# Patient Record
Sex: MALE | Race: WHITE
[De-identification: names, ages, dates, MRNs, and addresses within clinical notes are randomized per-mention and may not be internally consistent; named-entity substitution may affect disease eponyms.]

---

## 2018-03-15 ENCOUNTER — HOSPITAL ENCOUNTER (OUTPATIENT)
Dept: HOSPITAL 89 - MRI | Age: 68
End: 2018-03-15
Attending: PSYCHIATRY & NEUROLOGY
Payer: MEDICARE

## 2018-03-15 DIAGNOSIS — I67.82: ICD-10-CM

## 2018-03-15 DIAGNOSIS — R90.82: Primary | ICD-10-CM

## 2018-03-15 PROCEDURE — 70551 MRI BRAIN STEM W/O DYE: CPT

## 2018-03-15 NOTE — RADIOLOGY IMAGING REPORT
[place] [facility] [place] 

 

PATIENT NAME: Luke Bledsoe

: 1950

MR: [id number]

V: [id number]

EXAM DATE: 

ORDERING PHYSICIAN: ANAIS VILLELA

TECHNOLOGIST: 

 

Location: [facility]

Patient: Luke Bledsoe

: 1950

MRN: [id number]

Visit/Account:[id number]

Date of Sevice:  3/15/2018

 

ACCESSION #: [id number]

 

MRI Brain without IV contrast

 

Indication: Cognitive decline.  Forgetfulness.

 

Comparison:  None available

 

Technique: Sagittal T1-weighted, axial FLAIR, T2-weighted T1-weighted, gradient echo, coronal T2-weig
hted, axial diffusion weighted and ADC map images were obtained through the brain.  IV contrast was n
ot administered.

 

Findings:

No evidence of mass, mass effect, or midline shift.

No acute intracranial hemorrhage or acute territorial infarction.

Unremarkable appearance of the corpus callosum.  Normal posterior pituitary bright spot noted.

 

No restricted diffusion on the diffusion-weighted images.

Prominent multifocal periventricular deep white matter hyperintensities within the corona radiata and
 centrum semiovale seen related to chronic small vessel ischemic changes versus underlying vasculitis
 or chronic migraines.  A demyelinating process is considered less likely with this distribution.

 

Normal flow voids cerebral vasculature.

There are no air-fluid levels identified.  Nonspecific mild patchy opacification visualized right mas
toid air cells.  There are also mucous retention cysts of the visualized bilateral maxillary sinuses 
slightly greater on the right than the left as well as near circumferential mucosal thickening visual
ized bilateral maxillary sinuses.  The ethmoid air cells appear patent.

 

Bilateral globes are intact.

 

 

IMPRESSION:

1.  Prominent periventricular deep white matter chronic small vessel ischemic changes suggested versu
s chronic migraines or underlying vasculitis.

2.  No acute intracranial abnormality.

 

Report Dictated By: Felipe Marie MD at 3/15/2018 2:27 PM

 

Report E-Signed By: Felipe Marie MD  at 3/15/2018 2:31 PM

 

WSN:[id number]

## 2018-08-28 ENCOUNTER — HOSPITAL ENCOUNTER (OUTPATIENT)
Dept: HOSPITAL 89 - US | Age: 68
End: 2018-08-28
Attending: NURSE PRACTITIONER
Payer: MEDICARE

## 2018-08-28 DIAGNOSIS — R59.0: Primary | ICD-10-CM

## 2018-08-28 DIAGNOSIS — R22.1: ICD-10-CM

## 2018-08-28 PROCEDURE — 76536 US EXAM OF HEAD AND NECK: CPT

## 2018-08-28 NOTE — RADIOLOGY IMAGING REPORT
FACILITY: [facility] 

 

PATIENT NAME: Luke Bledsoe

: 1950

MR: [id number]

V: [id number]

EXAM DATE: 

ORDERING PHYSICIAN: EVELYN LEON

TECHNOLOGIST: 

 

Location: [facility]

Patient: Luke Bledsoe

: 1950

MRN: [id number]

Visit/Account:[id number]

Date of Sevice:  2018

 

ACCESSION #: [id number]

 

SOFT TISSUE HEAD NECK

 

HISTORY:  Two palpable lumps on left side of neck

 

COMPARISON:  None.

 

FINDINGS:

Thyroid :

      Right - Negative.

      Left - Negative.

 

Largest cervical nodes:

      Right - none identified

      Left - the palpable area corresponds to submandibular area, anterior zone II.  There is a well-
circumscribed benign-appearing lymph node measuring 6 x 8 x 3 mm.  There is also a small cyst in this
 area that measures 5 x 4 x 3 mm.

 

Small subcentimeter benign lymph nodes are noted within the remainder of the cervical chains.

 

IMPRESSION:

In the region of patient's palpable mass, there is a small subcentimeter benign lymph node and a smal
l subcentimeter cyst.

 

Report Dictated By: Luis Hammer at 2018 3:51 PM

 

Report E-Signed By: Luis Hammer  at 2018 4:01 PM

 

WSN:[email or web address]

## 2018-12-06 ENCOUNTER — HOSPITAL ENCOUNTER (OUTPATIENT)
Dept: HOSPITAL 89 - OR | Age: 68
Discharge: HOME | End: 2018-12-06
Attending: SURGERY
Payer: MEDICARE

## 2018-12-06 VITALS — DIASTOLIC BLOOD PRESSURE: 76 MMHG | SYSTOLIC BLOOD PRESSURE: 117 MMHG

## 2018-12-06 VITALS — WEIGHT: 160 LBS | HEIGHT: 68 IN | BODY MASS INDEX: 24.25 KG/M2

## 2018-12-06 VITALS — DIASTOLIC BLOOD PRESSURE: 69 MMHG | SYSTOLIC BLOOD PRESSURE: 119 MMHG

## 2018-12-06 VITALS — DIASTOLIC BLOOD PRESSURE: 91 MMHG | SYSTOLIC BLOOD PRESSURE: 138 MMHG

## 2018-12-06 VITALS — DIASTOLIC BLOOD PRESSURE: 66 MMHG | SYSTOLIC BLOOD PRESSURE: 113 MMHG

## 2018-12-06 VITALS — DIASTOLIC BLOOD PRESSURE: 66 MMHG | SYSTOLIC BLOOD PRESSURE: 107 MMHG

## 2018-12-06 VITALS — SYSTOLIC BLOOD PRESSURE: 108 MMHG | DIASTOLIC BLOOD PRESSURE: 66 MMHG

## 2018-12-06 VITALS — SYSTOLIC BLOOD PRESSURE: 115 MMHG | DIASTOLIC BLOOD PRESSURE: 63 MMHG

## 2018-12-06 VITALS — DIASTOLIC BLOOD PRESSURE: 67 MMHG | SYSTOLIC BLOOD PRESSURE: 104 MMHG

## 2018-12-06 VITALS — DIASTOLIC BLOOD PRESSURE: 69 MMHG | SYSTOLIC BLOOD PRESSURE: 106 MMHG

## 2018-12-06 VITALS — DIASTOLIC BLOOD PRESSURE: 68 MMHG | SYSTOLIC BLOOD PRESSURE: 96 MMHG

## 2018-12-06 VITALS — DIASTOLIC BLOOD PRESSURE: 70 MMHG | SYSTOLIC BLOOD PRESSURE: 111 MMHG

## 2018-12-06 VITALS — DIASTOLIC BLOOD PRESSURE: 70 MMHG | SYSTOLIC BLOOD PRESSURE: 119 MMHG

## 2018-12-06 VITALS — DIASTOLIC BLOOD PRESSURE: 70 MMHG | SYSTOLIC BLOOD PRESSURE: 113 MMHG

## 2018-12-06 DIAGNOSIS — K40.20: Primary | ICD-10-CM

## 2018-12-06 PROCEDURE — 49650 LAP ING HERNIA REPAIR INIT: CPT

## 2018-12-06 NOTE — SHORT(OUTPT) DISCHARGE SUMMARY
Discharge Summary


Reason for Hosp/Final Diag:  


(1) Bilateral inguinal hernia (BIH)


Status:  Resolved


Hospital Course & Plan:  Robotic BIH repair completed without problems.





Departure


Discharge to:  Home, Self Care





Discharge Instructions


Home Meds


Active Scripts


Docusate Sodium (COLACE) 100 Mg Capsule, 1 CAP PO BID, #30 CAPSULE 0 Refills


   Prov:ULLRICH,JOHN A MD         12/6/18


Oxycodone Hcl/Acet 5/325 Mg (ENDOCET 5-325 TABLET) 1 Each Tablet, 1 TAB PO Q4H 


PRN for PAIN, #20 TAB 0 Refills


   Prov:ULLRICH,JOHN A MD         12/6/18


Reported Medications


Multivitamin (MULTI VITAMIN DAILY) 1 Each Tablet, 1 TAB PO QDAY


   10/23/18


Aspirin (ASPIRIN) 81 Mg Tab.chew, 81 MG PO QDAY, TAB.CHEW


   10/23/18


Omega-3 Fatty Acids/Fish Oil (FISH OIL 1,000 MG SOFTGEL) Unknown Strength 


Capsule, PO, CAPSULE


   10/23/18


Ascorbic Acid (VITAMIN C) Unknown Strength Tablet, PO, TAB


   10/23/18


Cholecalciferol (Vitamin D3) (VITAMIN D) Unknown Strength Capsule, PO, CAPSULE


   10/23/18


Turmeric (TURMERIC) Unknown Strength Powder, MC


   10/23/18


Levothyroxine Sodium (LEVOTHYROXINE SODIUM) 75 Mcg Tablet, 1 TAB PO QDAY, TAB


   10/12/18


Amitriptyline Hcl (AMITRIPTYLINE HCL) 50 Mg Tablet, 1 TAB PO QHS, #5 TAB


   10/12/18


Follow up Referrals:  


General Surgery - 12/18/18 @ Surgery, General with ULLRICH,JOHN A MD You have a 


follow up appointment scheduled with Dr. Ullrich on Tuesday, 12/18/18, at 


4:15pm.





Diet:  Regular


Activity:  No Heavy Lifting


Special Instructions:  


You may remove the white surgical dressings on Saturday, 12/8/18, then you


can shower.  After showering, leave the incisions open to air but leave


the steristrips in place until they fall off on their own.  Do not immerse


the incisions for 2 weeks.  Avoid any activity that involves straining or


lifting more than 10 pounds for 2 weeks after surgery.





Problem Qualifiers





(1) Bilateral inguinal hernia (BIH):  


Obstruction and gangrene presence:  without obstruction or gangrene  Recurrence:


 non-recurrent  Qualified Codes:  K40.20 - Bilateral inguinal hernia, without 


obstruction or gangrene, not specified as recurrent








ULLRICH,JOHN A MD               Dec 6, 2018 12:51

## 2018-12-06 NOTE — POST OPERATIVE PROGRESS NOTE
Post Operative Progress Note


Date:  Dec 6, 2018


Time:  12:51


Surgeon:  


Ullrich





Dictation number:  [id number]


Anesthesia:  


GETA by Dr. Moody


Pre-Op Diagnosis:  


RI


Post-Op Diagnosis:  


BIH


Findings:  


Bilateral indirect inguinal hernias


Procedure(s):  


Robotic BIH repair


Adhesiolysis


Specimen Removed:(May be N/A):  


None


Complications:  


None


Fluids:  


See anesthesia record


Estimated Blood Loss:  


Minimal


Date OP Note Dictated:  Dec 6, 2018


Time OP Note Dictated:  12:52











ULLRICH,JOHN A MD               Dec 6, 2018 12:58

## 2018-12-06 NOTE — OPERATIVE REPORT 1
EVENT DATE: December 6, 2018

SURGEON: John Ullrich, MD 

ANESTHESIOLOGIST: Ismael Moody MD 

ANESTHESIA: General endotracheal 



PREOPERATIVE DIAGNOSIS  

Right inguinal hernia. 



POSTOPERATIVE DIAGNOSIS 

Bilateral inguinal hernias, indirect on both sides.  



PROCEDURE PERFORMED 

1.  Robotic bilateral inguinal hernia repair. 

2.  Robotic adhesiolysis.



COMPLICATIONS 

None. 



CONDITION 

Stable. 



ESTIMATED BLOOD LOSS 

Minimal.  



FINDINGS 

This patient had bilateral indirect inguinal hernias.  



INDICATIONS

This is a 68-year-old gentleman who presented to my office with a right groin 

bulge.  On exam, this was consistent with an inguinal hernia.  The other groin 

was unremarkable.  I discussed hernias with him and their treatment including 

robotic hernia repair and he elected to proceed with robotic inguinal hernia 

repair.  



DESCRIPTION OF PROCEDURE 

The patient was brought to the operating room and placed supine on the operating

table.  General endotracheal anesthesia was administered.  His abdomen was 

prepped and draped in sterile fashion.  A timeout was completed and I injected 

the left subcostal skin with 0.5% Ropivacaine plain and made an 8 mm transverse 

incision in the left subcostal skin several centimeters inferior to the costal 

margin and just lateral to the mid clavicular line.  I used a Veress needle and 

insufflated the abdomen to a pressure of 15 mmHg and then inserted a robotic 8 

mm optical port in through this wound with the camera in focus without any 

problems.  Next, I placed an 8 mm robotic port in the right subcostal skin and 

then one in the epigastric midline.  Inspection of the groins revealed the 

expected right inguinal hernia which was an indirect hernia as well as some 

adhesions of omentum to the abdominal wall where the previous appendectomy had 

been performed many years ago.  Also apparent, was another indirect inguinal 

hernia on the left groin.  I popped in a bilateral ProGrip mesh as well as 2 V-

Loc sutures into the abdomen and then brought the robot in, docked it, and 

targeted it, inserted the instruments and the scrubbed out and went to the 

console.  I then took down the omentum from the anterior abdominal wall which 

was done rather easily, no bowel was involved.  I then divided the peritoneum 

from just medial to the right anterior superior iliac spine all the way across 

the midline, all the way to the left anterior superior iliac spine.  I then 

dissected through the preperitoneal space and there were some thin areas just to

the patient's right of midline, probably related to his previous robotic 

prostatectomy, and so I actually went into the posterior rectus sheath and 

stripped this down as well to keep the integrity of the tissue good to cover the

mesh.  I dissected all the way down along the iliopubic tracts medially to the 

pubic bone and Nain's ligament.  This dissection in the midline was somewhat 

difficult made so by the adhesions from the previous prostatectomy, but I was 

able to do this without any injury to the bladder and I was able to identify all

of the landmarks.  I started with the left hernia and reduced it from the 

inguinal canal and  it from the cord structures relatively intact in 

terms of the sac, but down past where the vessels and vas splay there was a 

peritoneal defect where I could see in the peritoneal cavity through this defect

on that side.  There was a cord lipoma that I reduced out of the inguinal canal 

on the left as well and  this from the cord structures.  The vas and 

gonadal vessels were easily identified and preserved.  I then moved to the right

side and did the same procedure and  the much bigger hernia sac and it 

went much further down into the inguinal canal, but I was able to find the end 

of it and then separate it from the cord structures and dissected it free well 

past where all of the cord structures splayed.  After this was completed, I put 

the ProGrip mesh on each side and unfurled it, starting with the right side and 

made sure it covered the pubic tubercle as well as Nain's ligament and it 

covered the whole myopectineal orifice with several centimeters with overlap on 

all sides.  Then I deployed the left sided mesh in exactly the same way so they 

laid nice and symmetrically and flat with no wrinkles. I then closed the 

peritoneal defect with a running V-Loc absorbable suture and then took another 

V-Loc suture and closed the defect in the patient's left groin, peritoneum so 

that there was no exposed mesh.  The repair looked great when this was all done 

and so all of the sutures were removed from the abdomen as well as the 

instruments and then the robot was undocked.  The patient's abdomen desufflated 

and the ports were removed and I closed the skin with 4-0 Monocryl subcuticular 

sutures.  The skin was cleaned, dried, and Steri-Strips were applied, followed 

by sterile surgical dressings on each incision.  The patient was then awakened 

and extubated in the operating room and transported to the recovery room in 

stable medication condition having tolerated the procedure without any apparent 

problems.  

MTDD

## 2021-07-01 ENCOUNTER — APPOINTMENT (RX ONLY)
Dept: URBAN - NONMETROPOLITAN AREA CLINIC 29 | Facility: CLINIC | Age: 71
Setting detail: DERMATOLOGY
End: 2021-07-01

## 2021-07-01 DIAGNOSIS — L57.0 ACTINIC KERATOSIS: ICD-10-CM

## 2021-07-01 DIAGNOSIS — L21.8 OTHER SEBORRHEIC DERMATITIS: ICD-10-CM

## 2021-07-01 PROCEDURE — ? PRESCRIPTION

## 2021-07-01 PROCEDURE — 99203 OFFICE O/P NEW LOW 30 MIN: CPT | Mod: 25

## 2021-07-01 PROCEDURE — ? PRESCRIPTION MEDICATION MANAGEMENT

## 2021-07-01 PROCEDURE — ? LIQUID NITROGEN

## 2021-07-01 PROCEDURE — ? COUNSELING

## 2021-07-01 PROCEDURE — 17003 DESTRUCT PREMALG LES 2-14: CPT

## 2021-07-01 PROCEDURE — ? ADDITIONAL NOTES

## 2021-07-01 PROCEDURE — 17000 DESTRUCT PREMALG LESION: CPT

## 2021-07-01 RX ORDER — HYDROCORTISONE 25 MG/G
APPLY CREAM TOPICAL BID
Qty: 1 | Refills: 3 | Status: ERX | COMMUNITY
Start: 2021-07-01

## 2021-07-01 RX ADMIN — HYDROCORTISONE APPLY: 25 CREAM TOPICAL at 00:00

## 2021-07-01 ASSESSMENT — LOCATION SIMPLE DESCRIPTION DERM
LOCATION SIMPLE: RIGHT ZYGOMA
LOCATION SIMPLE: LEFT CHEEK
LOCATION SIMPLE: LEFT CHEEK
LOCATION SIMPLE: POSTERIOR SCALP
LOCATION SIMPLE: RIGHT CHEEK
LOCATION SIMPLE: RIGHT EAR
LOCATION SIMPLE: LEFT EAR
LOCATION SIMPLE: LEFT FOREHEAD
LOCATION SIMPLE: LEFT EAR

## 2021-07-01 ASSESSMENT — LOCATION DETAILED DESCRIPTION DERM
LOCATION DETAILED: LEFT CENTRAL MALAR CHEEK
LOCATION DETAILED: RIGHT MEDIAL ZYGOMA
LOCATION DETAILED: LEFT CAVUM CONCHA
LOCATION DETAILED: RIGHT SUPERIOR CRUS OF ANTIHELIX
LOCATION DETAILED: LEFT MEDIAL FOREHEAD
LOCATION DETAILED: LEFT POSTERIOR EAR
LOCATION DETAILED: LEFT SUPERIOR LATERAL MALAR CHEEK
LOCATION DETAILED: RIGHT SUPERIOR CENTRAL MALAR CHEEK
LOCATION DETAILED: LEFT SUPERIOR HELIX
LOCATION DETAILED: RIGHT POSTAURICULAR SKIN

## 2021-07-01 ASSESSMENT — LOCATION ZONE DERM
LOCATION ZONE: EAR
LOCATION ZONE: FACE
LOCATION ZONE: EAR
LOCATION ZONE: SCALP
LOCATION ZONE: FACE

## 2021-07-01 NOTE — PROCEDURE: PRESCRIPTION MEDICATION MANAGEMENT
Initiate Treatment: HC 2 1/2% cream to face and left knee
Detail Level: Zone
Render In Strict Bullet Format?: No
Plan: follow up in six weeks for recheck of facial skin, observe for occult AKs within areas of seb derm

## 2021-08-12 ENCOUNTER — APPOINTMENT (RX ONLY)
Dept: URBAN - NONMETROPOLITAN AREA CLINIC 29 | Facility: CLINIC | Age: 71
Setting detail: DERMATOLOGY
End: 2021-08-12

## 2021-08-12 DIAGNOSIS — L57.0 ACTINIC KERATOSIS: ICD-10-CM

## 2021-08-12 DIAGNOSIS — L21.8 OTHER SEBORRHEIC DERMATITIS: ICD-10-CM

## 2021-08-12 PROCEDURE — ? LIQUID NITROGEN

## 2021-08-12 PROCEDURE — 99213 OFFICE O/P EST LOW 20 MIN: CPT | Mod: 25

## 2021-08-12 PROCEDURE — ? COUNSELING

## 2021-08-12 PROCEDURE — 17000 DESTRUCT PREMALG LESION: CPT

## 2021-08-12 PROCEDURE — ? ADDITIONAL NOTES

## 2021-08-12 PROCEDURE — 17003 DESTRUCT PREMALG LES 2-14: CPT

## 2021-08-12 ASSESSMENT — LOCATION DETAILED DESCRIPTION DERM
LOCATION DETAILED: RIGHT INFERIOR FOREHEAD
LOCATION DETAILED: RIGHT INFERIOR CENTRAL MALAR CHEEK
LOCATION DETAILED: LEFT INFERIOR CENTRAL MALAR CHEEK
LOCATION DETAILED: LEFT CENTRAL ZYGOMA
LOCATION DETAILED: RIGHT CENTRAL TEMPLE
LOCATION DETAILED: SUPERIOR MID FOREHEAD
LOCATION DETAILED: LEFT INFERIOR LATERAL FOREHEAD
LOCATION DETAILED: RIGHT FOREHEAD

## 2021-08-12 ASSESSMENT — LOCATION ZONE DERM
LOCATION ZONE: FACE
LOCATION ZONE: FACE

## 2021-08-12 ASSESSMENT — LOCATION SIMPLE DESCRIPTION DERM
LOCATION SIMPLE: LEFT FOREHEAD
LOCATION SIMPLE: LEFT CHEEK
LOCATION SIMPLE: RIGHT CHEEK
LOCATION SIMPLE: RIGHT FOREHEAD
LOCATION SIMPLE: SUPERIOR FOREHEAD
LOCATION SIMPLE: LEFT ZYGOMA
LOCATION SIMPLE: RIGHT TEMPLE

## 2021-08-12 NOTE — PROCEDURE: LIQUID NITROGEN
Post-Care Instructions: I reviewed with the patient in detail post-care instructions. Patient is to wear sunprotection, and avoid picking at any of the treated lesions. Pt may apply Vaseline to crusted or scabbing areas.
Duration Of Freeze Thaw-Cycle (Seconds): 0
Show Applicator Variable?: Yes
Consent: The patient's consent was obtained including but not limited to risks of crusting, scabbing, blistering, scarring, darker or lighter pigmentary change, recurrence, incomplete removal and infection.
Render Post-Care Instructions In Note?: no
Detail Level: Detailed

## 2021-08-12 NOTE — PROCEDURE: COUNSELING
Patient Specific Counseling (Will Not Stick From Patient To Patient): Today is 6 week follow up, patient has been applying HC 2 1/2% cream to face. Much improved today
Detail Level: Zone

## 2022-02-17 ENCOUNTER — APPOINTMENT (RX ONLY)
Dept: URBAN - NONMETROPOLITAN AREA CLINIC 29 | Facility: CLINIC | Age: 72
Setting detail: DERMATOLOGY
End: 2022-02-17

## 2022-02-17 DIAGNOSIS — L57.0 ACTINIC KERATOSIS: ICD-10-CM

## 2022-02-17 DIAGNOSIS — D18.0 HEMANGIOMA: ICD-10-CM

## 2022-02-17 DIAGNOSIS — L81.4 OTHER MELANIN HYPERPIGMENTATION: ICD-10-CM

## 2022-02-17 DIAGNOSIS — L82.1 OTHER SEBORRHEIC KERATOSIS: ICD-10-CM

## 2022-02-17 DIAGNOSIS — L57.8 OTHER SKIN CHANGES DUE TO CHRONIC EXPOSURE TO NONIONIZING RADIATION: ICD-10-CM

## 2022-02-17 DIAGNOSIS — L23.1 ALLERGIC CONTACT DERMATITIS DUE TO ADHESIVES: ICD-10-CM

## 2022-02-17 PROBLEM — D18.01 HEMANGIOMA OF SKIN AND SUBCUTANEOUS TISSUE: Status: ACTIVE | Noted: 2022-02-17

## 2022-02-17 PROBLEM — D23.5 OTHER BENIGN NEOPLASM OF SKIN OF TRUNK: Status: ACTIVE | Noted: 2022-02-17

## 2022-02-17 PROCEDURE — ? TREATMENT REGIMEN

## 2022-02-17 PROCEDURE — ? FULL BODY SKIN EXAM - DECLINED

## 2022-02-17 PROCEDURE — ? PRESCRIPTION MEDICATION MANAGEMENT

## 2022-02-17 PROCEDURE — ? COUNSELING

## 2022-02-17 PROCEDURE — 17000 DESTRUCT PREMALG LESION: CPT

## 2022-02-17 PROCEDURE — ? ADDITIONAL NOTES

## 2022-02-17 PROCEDURE — ? LIQUID NITROGEN

## 2022-02-17 PROCEDURE — ? PRESCRIPTION

## 2022-02-17 PROCEDURE — 17003 DESTRUCT PREMALG LES 2-14: CPT

## 2022-02-17 PROCEDURE — 99213 OFFICE O/P EST LOW 20 MIN: CPT | Mod: 25

## 2022-02-17 RX ORDER — FLUOROURACIL 2 G/40G
CREAM TOPICAL
Qty: 40 | Refills: 2 | Status: ERX | COMMUNITY
Start: 2022-02-17

## 2022-02-17 RX ORDER — FLUOCINONIDE 0.5 MG/G
OINTMENT TOPICAL
Qty: 60 | Refills: 3 | Status: ERX | COMMUNITY
Start: 2022-02-17

## 2022-02-17 RX ADMIN — FLUOROURACIL: 2 CREAM TOPICAL at 00:00

## 2022-02-17 RX ADMIN — FLUOCINONIDE: 0.5 OINTMENT TOPICAL at 00:00

## 2022-02-17 ASSESSMENT — LOCATION ZONE DERM
LOCATION ZONE: TRUNK
LOCATION ZONE: EAR
LOCATION ZONE: ARM
LOCATION ZONE: LEG

## 2022-02-17 ASSESSMENT — LOCATION DETAILED DESCRIPTION DERM
LOCATION DETAILED: LEFT SUPERIOR POSTERIOR HELIX
LOCATION DETAILED: LEFT POSTERIOR SHOULDER
LOCATION DETAILED: RIGHT POSTERIOR SHOULDER
LOCATION DETAILED: LEFT SUPERIOR CRUS OF ANTIHELIX
LOCATION DETAILED: RIGHT SUPERIOR MEDIAL UPPER BACK
LOCATION DETAILED: LEFT TRAGUS
LOCATION DETAILED: RIGHT SUPERIOR CRUS OF ANTIHELIX
LOCATION DETAILED: INFERIOR THORACIC SPINE
LOCATION DETAILED: LEFT ANTIHELIX
LOCATION DETAILED: RIGHT ANTIHELIX
LOCATION DETAILED: RIGHT PROXIMAL POSTERIOR UPPER ARM
LOCATION DETAILED: LEFT PROXIMAL POSTERIOR UPPER ARM
LOCATION DETAILED: LEFT KNEE
LOCATION DETAILED: LEFT SUPERIOR UPPER BACK
LOCATION DETAILED: SUPERIOR THORACIC SPINE
LOCATION DETAILED: UPPER STERNUM

## 2022-02-17 ASSESSMENT — LOCATION SIMPLE DESCRIPTION DERM
LOCATION SIMPLE: LEFT POSTERIOR UPPER ARM
LOCATION SIMPLE: UPPER BACK
LOCATION SIMPLE: CHEST
LOCATION SIMPLE: LEFT KNEE
LOCATION SIMPLE: LEFT EAR
LOCATION SIMPLE: LEFT UPPER BACK
LOCATION SIMPLE: RIGHT UPPER BACK
LOCATION SIMPLE: RIGHT EAR
LOCATION SIMPLE: RIGHT SHOULDER
LOCATION SIMPLE: RIGHT POSTERIOR UPPER ARM
LOCATION SIMPLE: LEFT SHOULDER

## 2022-02-17 NOTE — PROCEDURE: TREATMENT REGIMEN
Detail Level: Zone
Initiate Treatment: Recommend broad spectrum sunscreen with SPF 30 or greater daily on face and chest and more areas if outside. Reapply every 2 hours or 80 minutes if in water
Plan: Efudex: Apply twice daily for 2-3 weeks to skin on face. Okay to treat in sections at a time instead of full face at once. \\nIf stinging or irritation occurs, ok to apply hydrocortisone ointment OTC topically 30 min after efudex application.
Plan: Apply fluocinonide topically twice daily to skin irritation on left knee, right shin, and left ear twice daily until clear. Do not use on face, armpits, or groin.

## 2022-02-17 NOTE — PROCEDURE: PRESCRIPTION MEDICATION MANAGEMENT
Detail Level: Zone
Render In Strict Bullet Format?: No
Plan: Fluocinonide: Apply topically to affected skin on left knee twice daily until clear. Do not apply to face, armpits or groin.
Plan: Efudex: Apply twice daily for 2-3 weeks to skin on face. Okay to treat in sections at a time instead of full face at once.

## 2022-02-17 NOTE — PROCEDURE: LIQUID NITROGEN
Consent: The patient's consent was verbally obtained including but not limited to risks of crusting, scabbing, blistering, scarring, darker or lighter pigmentary change, recurrence, incomplete removal and infection.
Render Post-Care Instructions In Note?: yes
Post-Care Instructions: I reviewed with the patient in detail post-care instructions. Recommend Vaseline multiple times per daily to areas until healed, gentle cleansing recommended.
Render Note In Bullet Format When Appropriate: No
Detail Level: Detailed
Duration Of Freeze Thaw-Cycle (Seconds): 0

## 2022-02-17 NOTE — HPI: EVALUATION OF SKIN LESION(S)
Hpi Title: Evaluation of Skin Lesions
Additional History: Pt presents for a UBSE. Pt declines FBSE. Pt reports he has a spot on his back providers always look at, an itchy spot on his left knee from a previous bandage he left on for several days- irritation ongoing for 1 year now. Previously rx’d hydrocortisone 2.5% and it did not clear.